# Patient Record
Sex: MALE | Race: WHITE
[De-identification: names, ages, dates, MRNs, and addresses within clinical notes are randomized per-mention and may not be internally consistent; named-entity substitution may affect disease eponyms.]

---

## 2018-06-27 ENCOUNTER — HOSPITAL ENCOUNTER (OUTPATIENT)
Dept: HOSPITAL 80 - FIMAGING | Age: 48
End: 2018-06-27
Attending: INTERNAL MEDICINE
Payer: COMMERCIAL

## 2018-06-27 DIAGNOSIS — J98.4: Primary | ICD-10-CM

## 2018-12-10 ENCOUNTER — HOSPITAL ENCOUNTER (EMERGENCY)
Dept: HOSPITAL 80 - FED | Age: 48
Discharge: HOME | End: 2018-12-10
Payer: COMMERCIAL

## 2018-12-10 VITALS — DIASTOLIC BLOOD PRESSURE: 70 MMHG | SYSTOLIC BLOOD PRESSURE: 122 MMHG

## 2018-12-10 DIAGNOSIS — F17.200: ICD-10-CM

## 2018-12-10 DIAGNOSIS — J40: Primary | ICD-10-CM

## 2018-12-10 DIAGNOSIS — Z87.01: ICD-10-CM

## 2018-12-10 NOTE — EDPHY
H & P


Stated Complaint: sob/dry cough/ hx cryptogenic organizing pna


Time Seen by Provider: 12/10/18 14:14


HPI/ROS: 





CHIEF COMPLAINT:  Dry nonproductive cough





HISTORY OF PRESENT ILLNESS:  The patient presents to the ED with a one-week 

history of a dry nonproductive cough.  The patient complains of mild associated 

rhinorrhea.  He has dyspnea.  He denies pleuritic chest pain.  He denies any 

asymmetric calf pain or swelling.  The patient does have a history of bacterial 

pneumonia treated 2 years ago.  The patient does continue to smoke half a pack 

of cigarettes a day.  The patient denies any complaints of acute abdominal 

pain.  He denies rash.  He denies recent prolonged immobility.





REVIEW OF SYSTEMS:


A comprehensive 10 point review of systems is otherwise negative aside from 

elements mentioned in the history of present illness.


Source: Patient





- Personal History


Current Tetanus Diphtheria and Acellular Pertussis (TDAP): Yes





- Medical/Surgical History


Hx Asthma: No


Hx Chronic Respiratory Disease: No


Hx Diabetes: No


Hx Cardiac Disease: No


Hx Renal Disease: No


Hx Cirrhosis: No


Hx Alcoholism: No


Hx HIV/AIDS: No


Hx Splenectomy or Spleen Trauma: No


Other PMH: anxiety, depression, GERD. cryptogenic organizing pna





- Social History


Smoking Status: Current every day smoker





- Physical Exam


Exam: 





General Appearance:  Alert, no distress


Eyes:  Pupils equal and round no pallor or injection


ENT, Mouth:  Mucous membranes moist


Respiratory:  There are no retractions, lungs are clear to auscultation


Cardiovascular:  Regular rate and rhythm


Gastrointestinal:  Abdomen is soft and nontender, no masses, bowel sounds normal


Neurological:  A&O, normal motor function, normal sensory exam, normal cranial 

nerves


Skin:  Warm and dry, no rashes


Musculoskeletal:  Neck is supple nontender


Extremities:  symmetrical, full range of motion











Constitutional: 


 Initial Vital Signs











Temperature (C)  37.3 C   12/10/18 12:51


 


Heart Rate  104 H  12/10/18 12:51


 


Respiratory Rate  18   12/10/18 12:51


 


Blood Pressure  127/87 H  12/10/18 12:51


 


O2 Sat (%)  92   12/10/18 12:51








 











O2 Delivery Mode               Room Air














Allergies/Adverse Reactions: 


 





azithromycin Allergy (Severe, Verified 12/10/18 12:50)


 Hives


ceftriaxone Allergy (Severe, Verified 12/10/18 12:50)


 Hives








Home Medications: 














 Medication  Instructions  Recorded


 


Citalopram [CeleXA 20 MG] 20 mg PO HS 02/08/16


 


Albuterol [Ventolin Hfa Inhaler] 2 puffs IH QID PRN #1 mdi 12/10/18


 


Doxycycline Hyclate 100 mg PO BID #20 tablet 12/10/18














Medical Decision Making





- Diagnostics


Imaging Results: 


 Imaging Impressions





Chest X-Ray  12/10/18 14:29


Impression: Negative chest.











ED Course/Re-evaluation: 





The patient presents the ED with a one-week history of a cough with upper 

respiratory symptoms.  The patient is noted to be hemodynamically stable.  

Chest x-ray demonstrates no evidence of a focal pneumonia.  The patient has no 

symptoms suggestive of pulmonary embolism.





The patient will be given a prescription for an albuterol inhaler.  The patient 

is advised to follow up with his primary care provider for any unimproved 

symptoms.





The patient is advised to return to the emergency department for worsening 

respiratory symptoms or other concerns.





I re-evaluated the patient at 3:20 p.m. and he is in no acute distress.





Given the patient's history of significant pneumonia who will be placed on 

doxycycline for the next 10 days.








Differential Diagnosis: 





Differential diagnosis considered includes asthma, bronchitis, pneumonia





- Data Points


Medications Given: 


 








Discontinued Medications





Albuterol/Ipratropium (Duoneb)  3 ml IH EDNOW ONE


   Stop: 12/10/18 14:30


   Last Admin: 12/10/18 14:31 Dose:  3 ml








Departure





- Departure


Disposition: Home, Routine, Self-Care


Clinical Impression: 


 Acute bronchitis





Condition: Good


Instructions:  Acute Bronchitis (ED)


Additional Instructions: 


1. Please use your inhaler up to every 2-4 hours as needed for cough.


2. Please return to the ED for any markedly worsening symptoms or other 

concerns.


3. Your x-ray demonstrated no evidence of an obvious pneumonia.


4. Please follow-up with your primary care provider in the next several days 

for recheck.  


5. Please take antibiotics as directed


Referrals: 


Xander Otoole MD [Primary Care Provider] - As per Instructions

## 2018-12-18 ENCOUNTER — HOSPITAL ENCOUNTER (INPATIENT)
Dept: HOSPITAL 80 - FED | Age: 48
LOS: 3 days | Discharge: HOME | DRG: 175 | End: 2018-12-21
Attending: INTERNAL MEDICINE | Admitting: INTERNAL MEDICINE
Payer: COMMERCIAL

## 2018-12-18 DIAGNOSIS — I26.99: Primary | ICD-10-CM

## 2018-12-18 DIAGNOSIS — I51.7: ICD-10-CM

## 2018-12-18 DIAGNOSIS — Z72.0: ICD-10-CM

## 2018-12-18 DIAGNOSIS — J96.01: ICD-10-CM

## 2018-12-18 DIAGNOSIS — Z86.718: ICD-10-CM

## 2018-12-18 DIAGNOSIS — F32.9: ICD-10-CM

## 2018-12-18 DIAGNOSIS — I10: ICD-10-CM

## 2018-12-18 PROCEDURE — G0378 HOSPITAL OBSERVATION PER HR: HCPCS

## 2018-12-18 RX ADMIN — CITALOPRAM HYDROBROMIDE SCH MG: 20 TABLET ORAL at 21:42

## 2018-12-18 RX ADMIN — DOXYCYCLINE HYCLATE SCH MG: 100 CAPSULE, GELATIN COATED ORAL at 21:42

## 2018-12-18 NOTE — PDGENHP
History and Physical





- Chief Complaint


sob





- History of Present Illness


47 yo M with PMH that includes DVT several years ago, off of AC, and prior 

cryptogenic organizing pneumonia, presenting with 2 weeks of sob. He notes it 

came on fairly suddenly and was worse with any exertion, even just walking 

across the room. He did not have any associated fever, chest pain, cough or 

sputum production. He did not have any leg swelling or pain. He notes that he 

currently feels better since getting supplemental o2 in place. He has never had 

similar sxs in the past. He notes he is relatively sedentary but otherwise has 

not had any changes in his health or risk factors for recurrent VTE. 





History Information





- Allergies/Home Medication List


Allergies/Adverse Reactions: 








azithromycin Allergy (Severe, Verified 12/18/18 18:25)


 Hives


ceftriaxone Allergy (Severe, Verified 12/18/18 18:25)


 Hives





Home Medications: 








Citalopram [CeleXA 20 MG] 20 mg PO HS 02/08/16 [Last Taken 12/17/18]


Albuterol [Ventolin Hfa Inhaler] 2 puffs IH QID PRN 12/18/18 [Last Taken 12/18/ 18]





I have personally reviewed and updated: family history, medical history, social 

history, surgical history





- Past Medical History


asthma, DVT, GERD, psychiatric history (depression)


Additional medical history: 





- Surgical History


Reports: no pertinent surgical hx





- Family History


Positive for: non-pertinent





- Social History


Smoking Status: Current every day smoker


Alcohol Use: Occasionally


Drug Use: None


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





Review of Systems


Review of Systems: 





ROS: 10pt was reviewed & negative except for what was stated in HPI & below





Physical Exam


Physical Exam: 

















Temp Pulse Resp BP Pulse Ox


 


 37.1 C   61   12   145/88 H  90 L


 


 12/18/18 20:54  12/18/18 20:54  12/18/18 20:54  12/18/18 20:54  12/18/18 20:54




















O2 (L/minute)                  2.5














Constitutional: no apparent distress, obese


Eyes: PERRL, anicteric sclera


Ears, Nose, Mouth, Throat: moist mucous membranes, hearing normal


Cardiovascular: regular rate and rhythym, no murmur, rub, or gallop, No edema


Respiratory: no respiratory distress, no rales or rhonchi


Gastrointestinal: normoactive bowel sounds, soft, non-tender abdomen


Genitourinary: no bladder tenderness


Skin: warm, normal color


Musculoskeletal: full muscle strength


Neurologic: AAOx3


Psychiatric: interacting appropriately, not anxious





Lab Data & Imaging Review














NT-Pro-B Natriuret Pep  533 pg/mL (0-125)  H  12/18/18  21:17    








Visualized and Interpreted imaging results: Yes


Interpretation: CTA: large volume bilateral PE


Visualized and Interpreted EKG results: Yes


EKG Interpretation: Positive for: normal sinsus rhythm, T waves inversion





Assessment & Plan


Assessment: 








Pulmonary embolism (Acute)





47 yo M with PMH of DVT and  admitted with sob and found to have large 

volume bilateral PE





# acute bilateral large volume PE: patient is HD stable, no significant risk 

factor for VTE appreciated however with prior DVT query underlying 

coagulopathy. Discussed with patient that will start AC and request that he f/u 

with hematology and have age appropriate cancer screening including 

colonoscopy. Started on lovenox for now, will likely transition to DOAC in am, 

was on xarelto previously and notes he tolerated that well. Does have elevated 

BNP and twi on ecg so will get f/u echo in am for further risk stratification. 


# acute hypoxic respiratory failure: in setting of above and requiring 2L to 

maintain o2 sats > 90%


# hx of : noted to have some scarring RLL likely related to same


# hypertension: not chronically on any BP meds, will monitor


# depression: continue citalopram


# observation status


Patient new to my care. Old records reviewed and summarized as above. Care plan 

reviewed with ER doctor as above.

## 2018-12-18 NOTE — EDPHY
H & P


Time Seen by Provider: 12/18/18 18:34


HPI/ROS: 





CHIEF COMPLAINT:  Shortness of breath and elevated D-dimer





HISTORY OF PRESENT ILLNESS:  Patient had a diagnosis of cryptogenic organizing 

pneumonia back in February of 2016 was treated successfully with prednisone a 

follow-up with Dr. Zavala.  He was in our ED on December 10th with dry cough 

and some shortness of breath, was treated with doxycycline after negative chest 

x-ray and today was following up with his primary care physician at [facility].  He had a EKG performed and troponin that was negative.  He had a 

positive D-dimer 3.36 and a creatinine 1.1 was referred to the emergency 

department.


Patient tells me that he still gets really short of breath in that he lives on 

a 3rd floor condominium and has to stop and catch his breath only after going 

up a flight and half stairs.  Still has a little bit of a cough but no chest 

pain.  No fevers or chills.  No leg swelling.  Symptoms moderate to severe.





REVIEW OF SYSTEMS:


Eye: no change in vision


ENT: no sore throat


Cardiac: no chest pain or syncope


Pulmonary:  HPI


Abdomen: no vomiting, diarrhea, abdominal pain


Musculoskeletal: no back pain


Skin: no rash


Neuro: no headache


Constitutional: no fever


: no urinary symptoms





A comprehensive 10 point review of systems is otherwise negative aside from 

elements mentioned in the history of present illness.





PAST MEDICAL HISTORY:  Includes anxiety and depression, GERD, cryptogenic 

organizing pneumonia as above.





Social history:  Tobacco smoker





General Appearance: Alert and conversant, cooperative.


Eyes: No scleral  icterus. 


ENT, Mouth: Normal mucous membranes.


Respiratory: Normal respiratory effort, breath sounds equal, lungs are clear to 

auscultation.  Speaks in full sentences, no wheezing auscultated.


Cardiovascular:  Regular rate and rhythm.


Gastrointestinal:  Abdomen is soft and non tender.


Neurological: Alert, face symmetric, normal motor and sensory in extremities. 


Skin: Warm and dry, no rashes.


Musculoskeletal: No peripheral edema. 


Psychiatric: Not agitated.





Emergency Department course/MDM:





CT angiography discussed and consented.





1920:  Large volume bilateral pulmonary embolism discussed with Dr. De La Paz and 

with the patient, reviewed on the computer system with him.


Lovenox 1 milligram/kilogram subcutaneous, admission to hospitalist service.





Labs from earlier today including WBC 7.9, hematocrit 43.8, platelet 212. 

Sodium 141, potassium 4.7, glucose 88, creatinine 1.1, troponin less than 

0.012.  


Smoking Status: Current every day smoker


Constitutional: 


 Initial Vital Signs











Temperature (C)  36.7 C   12/18/18 18:26


 


Heart Rate  90   12/18/18 18:26


 


Respiratory Rate  19   12/18/18 18:26


 


Blood Pressure  131/91 H  12/18/18 18:26


 


O2 Sat (%)  94   12/18/18 18:26








 











O2 Delivery Mode               Room Air














Allergies/Adverse Reactions: 


 





azithromycin Allergy (Severe, Verified 12/18/18 18:25)


 Hives


ceftriaxone Allergy (Severe, Verified 12/18/18 18:25)


 Hives








Home Medications: 














 Medication  Instructions  Recorded


 


Citalopram [CeleXA 20 MG] 20 mg PO HS 02/08/16


 


Doxycycline Hyclate 100 mg PO BID #20 tablet 12/10/18


 


Albuterol [Ventolin Hfa Inhaler] 2 puffs IH QID PRN 12/18/18














Medical Decision Making





- Diagnostics


EKG Interpretation: 





12-lead EKG interpreted by me; official reading is in computer system.  My 

interpretation is sinus rhythm with anterior inverted T-waves, no ST elevation, 

rate 82. 


Imaging Results: 


 Imaging Impressions





Chest/Thorax CTA  12/18/18 18:40


Impression: 


1. Large volume bilateral pulmonary emboli involving segmental branches with 

associated focal areas of presumed pulmonary infarct.


2. Stable subpleural consolidation or scarring right lower lobe posterior 

laterally in this patient with history of cryptogenic organizing pneumonia.


3. Development of small left pleural effusion. 


 


Findings discussed with Jackson Rivas M.D.  at  19:15 hour, 12/18/2018.


 


 











Imaging: Discussed imaging studies w/ On call Radiologist


Differential Diagnosis: 





Differential diagnosis considered for shortness of breath including but not 

limited to pulmonary infectious process, COPD, asthma, pulmonary embolus and 

congestive heart failure.





- Data Points


Medications Given: 


 








Discontinued Medications





Enoxaparin Sodium (Lovenox)  120 mg SC EDNOW ONE


   Stop: 12/18/18 19:22


   Last Admin: 12/18/18 19:43 Dose:  120 mg








Departure





- Departure


Disposition: [facility] Inpatient Acute


Clinical Impression: 


Pulmonary embolism


Qualifiers:


 Pulmonary embolism type: unspecified Chronicity: acute 





Condition: Good

## 2018-12-18 NOTE — CPEKG
Test Reason : OPEN

Blood Pressure : ***/*** mmHG

Vent. Rate : 082 BPM     Atrial Rate : 082 BPM

   P-R Int : 147 ms          QRS Dur : 092 ms

    QT Int : 413 ms       P-R-T Axes : 048 053 044 degrees

   QTc Int : 483 ms

 

Sinus rhythm

Abnormal T, consider ischemia, anterior leads

 

Confirmed by Jackson Rivas ([id number]) on 12/18/2018 7:27:25 PM

 

Referred By:             Confirmed By:Jackson Rivas

## 2018-12-19 LAB — PLATELET # BLD: 185 10^3/UL (ref 150–400)

## 2018-12-19 RX ADMIN — DOXYCYCLINE HYCLATE SCH MG: 100 CAPSULE, GELATIN COATED ORAL at 07:44

## 2018-12-19 RX ADMIN — DOXYCYCLINE HYCLATE SCH MG: 100 CAPSULE, GELATIN COATED ORAL at 20:33

## 2018-12-19 RX ADMIN — CITALOPRAM HYDROBROMIDE SCH MG: 20 TABLET ORAL at 20:33

## 2018-12-19 RX ADMIN — ENOXAPARIN SODIUM SCH MG: 150 INJECTION SUBCUTANEOUS at 07:45

## 2018-12-19 RX ADMIN — ENOXAPARIN SODIUM SCH MG: 150 INJECTION SUBCUTANEOUS at 20:33

## 2018-12-19 NOTE — ASMTCMCOM
CM Note

 

CM Note                       

Notes:

Pt admitted to hospital with bilateral PE. He is a dailly smoker and was on anticoagulants in the 

past. Pt lives independently and works full time, anticipate he will dc home independent when 

medically stable. CM available for any changes.



DC Plan: Indepedent

 

Date Signed:  12/19/2018 11:49 AM

Electronically Signed By:Gladys Prince RN

## 2018-12-19 NOTE — HOSPPROG
Hospitalist Progress Note


Assessment/Plan: 





47 yo M w h/o , DVT here w PE w R heart strain





PE: large clot burden w ekg changes (CT images reviewed interp by me)


   echo done, read pending


   absence of tachycardia noted


   continue lovenox





pulm infarct: no hemoptysis





: stable off 02





proph: anticoagulated





tobacco use: recommend cessation


Subjective: ekgw anterior twi, which are new (interp by me)


Objective: 


 Vital Signs











Temp Pulse Resp BP Pulse Ox


 


 36.6 C   63   14   108/83 H  87 L


 


 12/19/18 07:10  12/19/18 07:10  12/19/18 07:10  12/19/18 07:10  12/19/18 07:59








 Laboratory Results





 12/19/18 04:33 





 12/19/18 04:33 





 











 12/18/18 12/19/18 12/20/18





 05:59 05:59 05:59


 


Intake Total  490 


 


Output Total  925 


 


Balance  -435 














- Physical Exam


Constitutional: no apparent distress, appears nourished


Eyes: PERRL, anicteric sclera


Ears, Nose, Mouth, Throat: moist mucous membranes, hearing normal


Cardiovascular: regular rate and rhythym, no murmur, rub, or gallop, other (no 

split s2)


Respiratory: no respiratory distress, other (R crackles)


Gastrointestinal: soft, non-tender abdomen


Genitourinary: no bladder fullness, No onofre in urethra


Skin: warm, normal color


Musculoskeletal: full muscle strength, No pain with ROM


Neurologic: AAOx3





ICD10 Worksheet


Patient Problems: 


 Problems











Problem Status Onset


 


Pulmonary embolism Acute  


 


Community acquired pneumonia Acute

## 2018-12-19 NOTE — PDMN
Medical Necessity


Medical necessity: Change to inpt as of 12/19/18 @ 15:28. Pt meets inpt 

criteria per MD order and MCG M-290, Pulmonary Embolism. 47 y/o w/PMH of DVT 

and  admitted w/sob and found to have acute, large volume bilateral PE. 

Upgraded to inpt today for  EKG changes- anterior TWI, evidence of R heart 

strain from PE, still requiring 3 LO2, anticipate>2MN for ongoing eval/

management of above.

## 2018-12-19 NOTE — ECHO
[email or web address]





[address] [address] 

Main: [phone] 



Fax: 



Transthoracic Echocardiogram 

Name:             BONNIE WILL                             MR#:

[id number]

Study Date:       2018                              Study Time:

08:45 AM

YOB: 1970                              Age:

48 year(s)

Height:           188 cm (74 in.)                         Weight:

118.39 kg (261 lb.)

BSA:              2.43 m2                                 Gender:

Male

Examination:      Echo                                    Indication:

large volume PE/elevated BNP

Image Quality:    Good                                    Contrast: 

Requested by:     Volodymyr Del Valle                         BP:

108 mmHg/83 mmHg

Heart Rate:                                               Rhythm: 

Indication:       large volume PE/elevated BNP 



Procedure Staff 

Ultrasound Technician:   Tanya Jacob RDCS 

Reading Physician:       Bonnie Rivera MD 

Requesting Provider: 



Conclusions:          Normal size left ventricle.  

Normal global systolic LV function.  

The ejection fraction is estimated to be 65-70 %.  

There is flattening of the interventricular septum c/w RV

pressure/volume overload..

Mildly to moderately dilated right ventricle.  

Mildly reduced RV function.  

The mitral valve is normal in appearance and function.  

Trivial to mild mitral regurgitation.  

The aortic valve is normal in appearance and function.  

The aortic valve is tri-leaflet.  

There is no aortic valve regurgitation.  

The pulmonary artery pressure could not be adequately estimated. 

When compared to the 16 study. The right ventricular findings are

new.



Measurements: 

Chambers                    Valvular Assessment AV/MV

Valvular Assessment TV/PV



Normal                                   Normal

Normal

Name         Value     Range              Name         Value Range

Name           Value Range

Ao Carmen (MM): 3.4 cm    (2.2 cm-3.7            AV Vmax:     1.19 m/s (1

m/s-1.7       TR Vmax:       2.54 mm/s ( - )



cm)                                  m/s)             TR PGmax:

26 mmHg ( - )

IVSd (2D):   0.7 cm (0.6 cm-1.1               AV meanPG:   3 mmHg ( -

)          syst. PAP: 31 mmHg  ( - )



cm)                MV E Vmax:   0.74 m/s ( - )  

LVDd (2D):   5.2 cm    (4.2 cm-5.9            MV A Vmax:   0.56 m/s (

- )



cm)                MV E/A:      1.32 ( - )  

LVDs (2D):   3.2 cm    (2.1 cm-4 



cm)   

LVPWd (2D):  1.1 cm    (0.6 cm-1 



cm)   

LVEF (MOD4): 72 %      (>=55 %)   

EF Range:    65-70 % 



Patient: BONNIE WILL                          MRN: [id number]

Study Date: 2018   Page 1 of 2

08:45 AM 









Continued Measurements: 

Chambers                      Valvular Assessment AV/MV

Valvular Assessment TV/PV



Name                       Value          Name

Value    Name                      Value

LADs Lon.5 cm               MV E' Septal:

0.07  m/s   CVP (est.):             5 mmHg

LA Area:                 18.3 cm2         MV E/E' Septal:

10.50

LA Volume:               49 ml            MV E/E' Lateral:

7.40

LA Volume Index:         20.2 ml/m2   



Additional Vessels  



Name                       Value  

Ao Ascending:            3.1 cm    



Findings:             Left Ventricle: 

Normal size left ventricle. No LV hypertrophy. Normal global systolic

LV function. The ejection

fraction is estimated to be 65-70 %. No regional wall motion

abnormality. Normal diastolic LV

function. There is flattening of the interventricular septum c/w RV

pressure/volume overload..

Right Ventricle: 

Mildly to moderately dilated right ventricle. Mildly reduced RV

function. There is a moderator band

noted in the right ventricle.  

Left Atrium: 

The left atrium is normal in size.  

Right Atrium: 

The right atrium is normal in size.  

Mitral Valve: 

The mitral valve is normal in appearance and function. Trivial to mild

mitral regurgitation.

Aortic Valve: 

The aortic valve is normal in appearance and function. The aortic

valve is tri-leaflet. There is no

aortic valve regurgitation.  

Tricuspid Valve: 

The tricuspid valve is normal in appearance and function. Trivial

tricuspid valve regurgitation.

Pulmonic Valve: 

The pulmonic valve is normal in appearance and function.  

Aorta: 

The aorta is normal.  

Pericardium: 

No pericardial effusion. 







Electronically signed by Bonnie Rivera MD on 2018 at 04:58 PM 

(No Signature Object) 



Patient: BONNIE WILL                          MRN: [id number]

Study Date: 2018   Page 2 of 2

08:45 AM 







[id number]

## 2018-12-20 RX ADMIN — ENOXAPARIN SODIUM SCH MG: 150 INJECTION SUBCUTANEOUS at 10:03

## 2018-12-20 RX ADMIN — CITALOPRAM HYDROBROMIDE SCH MG: 20 TABLET ORAL at 20:54

## 2018-12-20 RX ADMIN — ENOXAPARIN SODIUM SCH MG: 150 INJECTION SUBCUTANEOUS at 20:54

## 2018-12-20 RX ADMIN — DOXYCYCLINE HYCLATE SCH MG: 100 CAPSULE, GELATIN COATED ORAL at 10:03

## 2018-12-20 RX ADMIN — DOXYCYCLINE HYCLATE SCH MG: 100 CAPSULE, GELATIN COATED ORAL at 20:53

## 2018-12-20 NOTE — HOSPPROG
Hospitalist Progress Note


Assessment/Plan: 





47 yo M w h/o , DVT here w PE w R heart strain





PE: large clot burden w ekg changes (CT images reviewed interp by me)


   echo done, read pending


   absence of tachycardia noted


   continue lovenox





AHRF: presumably 2/2 pulm infarct


   i have reviewed the CT images myself





pulm infarct: no hemoptysis





: stable off 02





proph: anticoagulated





tobacco use: recommend cessation


Subjective: echo w RV dilation and HK, mild.  no hemoptysis


Objective: 


 Vital Signs











Temp Pulse Resp BP Pulse Ox


 


 36.7 C   53 L  16   133/69 H  94 


 


 12/20/18 08:00  12/20/18 08:00  12/20/18 08:00  12/20/18 08:00  12/20/18 08:00








 Laboratory Results





 12/19/18 04:33 





 12/19/18 04:33 





 











 12/19/18 12/20/18 12/21/18





 05:59 05:59 05:59


 


Intake Total 490 1000 


 


Output Total 925 1500 


 


Balance -435 -500 














- Physical Exam


Constitutional: no apparent distress, appears nourished


Eyes: PERRL, anicteric sclera


Ears, Nose, Mouth, Throat: moist mucous membranes, hearing normal


Cardiovascular: regular rate and rhythym, no murmur, rub, or gallop, No 

systolic murmur, No tachycardia


Respiratory: no respiratory distress, other (scattered rhonchi)


Gastrointestinal: normoactive bowel sounds, soft, non-tender abdomen


Genitourinary: No onofre in urethra


Skin: warm, normal color


Musculoskeletal: full muscle strength





ICD10 Worksheet


Patient Problems: 


 Problems











Problem Status Onset


 


Pulmonary embolism Acute  


 


Community acquired pneumonia Acute

## 2018-12-21 VITALS — DIASTOLIC BLOOD PRESSURE: 77 MMHG | SYSTOLIC BLOOD PRESSURE: 133 MMHG

## 2018-12-21 LAB — PLATELET # BLD: 176 10^3/UL (ref 150–400)

## 2018-12-21 RX ADMIN — ENOXAPARIN SODIUM SCH MG: 150 INJECTION SUBCUTANEOUS at 09:29

## 2018-12-21 NOTE — HOSPPROG
Hospitalist Progress Note


Assessment/Plan: 





49 yo M w h/o , DVT here w PE w R heart strain





PE: large clot burden w ekg changes (CT images reviewed interp by me)


   echo done, read pending


   absence of tachycardia noted


   continue lovenox


   change to xarelto on dc





AHRF: presumably 2/2 pulm infarct


   i have reviewed the CT images myself





pulm infarct: no hemoptysis





: stable off 02





proph: anticoagulated





tobacco use: recommend cessation





dc today


> 30 minutes on dc





Subjective: anxious for dc


Objective: 


 Vital Signs











Temp Pulse Resp BP Pulse Ox


 


 36.8 C   58 L  12   133/77 H  91 L


 


 12/21/18 07:26  12/21/18 07:26  12/21/18 07:26  12/21/18 07:26  12/21/18 07:26








 Laboratory Results





 12/21/18 04:15 





 12/19/18 04:33 





 











 12/20/18 12/21/18 12/22/18





 05:59 05:59 05:59


 


Intake Total 1000 890 


 


Output Total 1500 1400 375


 


Balance -500 -510 -375














- Physical Exam


Constitutional: no apparent distress, appears nourished


Eyes: PERRL, anicteric sclera


Ears, Nose, Mouth, Throat: moist mucous membranes, hearing normal


Cardiovascular: regular rate and rhythym, no murmur, rub, or gallop


Respiratory: no respiratory distress, no rales or rhonchi


Gastrointestinal: normoactive bowel sounds, soft, non-tender abdomen


Genitourinary: no bladder fullness, No onofre in urethra


Skin: warm, normal color


Musculoskeletal: full muscle strength, no muscle tenderness


Neurologic: AAOx3





ICD10 Worksheet


Patient Problems: 


 Problems











Problem Status Onset


 


Pulmonary embolism Acute  


 


Community acquired pneumonia Acute

## 2018-12-21 NOTE — GDS
DISCHARGE DIAGNOSES:  

1.  Pulmonary embolism, large volume.  

2.  Mild right heart strain.  

3.  Acute hypoxemic respiratory failure.

4.  History of venous thromboembolism.  

Please see admission history and physical by Dr. Volodymyr Del Valle. 



The patient presented on the evening of the 18th, with increased work of breathing and cough.  He had
 been sick for a bit of time.  He had been prescribed antibiotics from a previous hospital stay.  He 
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  CTA of the chest on the 18th
 revealed large volume pulmonary embolism with likely focal areas of presumed pulmonary infarct.  He 
had subpleural consolidation and scarring from his previous cryptogenic organizing pneumonia.  The pa
tient was started on low-molecular weight heparin.  Echocardiogram revealed mildly dilated RV with mi
ld hypokinesis.  He was neither tachycardic nor hypotensive and was ambulating without difficulty.  H
e had intermittent oxygen requirement here and an ambulatory room air challenge was pending at this t
[facility].  The patient was advised to quit smoking and is advised that he likely needs lifelong anticoagul
ation.  He is discharged home with 50 mg p.o. twice daily of Xarelto for 3 weeks followed by 20 daily
.  He has taken this previously for VTE and tolerated it.  Discharge status is home.





Job #:  [id number]